# Patient Record
Sex: FEMALE | Race: OTHER | HISPANIC OR LATINO | URBAN - METROPOLITAN AREA
[De-identification: names, ages, dates, MRNs, and addresses within clinical notes are randomized per-mention and may not be internally consistent; named-entity substitution may affect disease eponyms.]

---

## 2017-09-05 ENCOUNTER — EMERGENCY (EMERGENCY)
Facility: HOSPITAL | Age: 72
LOS: 1 days | Discharge: HOME | End: 2017-09-05
Attending: EMERGENCY MEDICINE
Payer: MEDICARE

## 2017-09-05 VITALS
SYSTOLIC BLOOD PRESSURE: 115 MMHG | WEIGHT: 136.91 LBS | HEIGHT: 61 IN | DIASTOLIC BLOOD PRESSURE: 71 MMHG | RESPIRATION RATE: 16 BRPM | TEMPERATURE: 99 F | HEART RATE: 70 BPM | OXYGEN SATURATION: 98 %

## 2017-09-05 DIAGNOSIS — K57.80 DIVERTICULITIS OF INTESTINE, PART UNSPECIFIED, WITH PERFORATION AND ABSCESS WITHOUT BLEEDING: ICD-10-CM

## 2017-09-05 DIAGNOSIS — I10 ESSENTIAL (PRIMARY) HYPERTENSION: ICD-10-CM

## 2017-09-05 DIAGNOSIS — Z79.2 LONG TERM (CURRENT) USE OF ANTIBIOTICS: ICD-10-CM

## 2017-09-05 LAB
ALBUMIN SERPL ELPH-MCNC: 3.3 G/DL — LOW (ref 3.5–5)
ALP SERPL-CCNC: 87 U/L — SIGNIFICANT CHANGE UP (ref 40–120)
ALT FLD-CCNC: 20 U/L DA — SIGNIFICANT CHANGE UP (ref 10–60)
ANION GAP SERPL CALC-SCNC: 6 MMOL/L — SIGNIFICANT CHANGE UP (ref 5–17)
APPEARANCE UR: CLEAR — SIGNIFICANT CHANGE UP
AST SERPL-CCNC: 13 U/L — SIGNIFICANT CHANGE UP (ref 10–40)
BACTERIA # UR AUTO: ABNORMAL /HPF
BASOPHILS # BLD AUTO: 0.1 K/UL — SIGNIFICANT CHANGE UP (ref 0–0.2)
BASOPHILS NFR BLD AUTO: 0.7 % — SIGNIFICANT CHANGE UP (ref 0–2)
BILIRUB SERPL-MCNC: 0.6 MG/DL — SIGNIFICANT CHANGE UP (ref 0.2–1.2)
BILIRUB UR-MCNC: ABNORMAL
BUN SERPL-MCNC: 12 MG/DL — SIGNIFICANT CHANGE UP (ref 7–18)
CALCIUM SERPL-MCNC: 9.6 MG/DL — SIGNIFICANT CHANGE UP (ref 8.4–10.5)
CHLORIDE SERPL-SCNC: 109 MMOL/L — HIGH (ref 96–108)
CO2 SERPL-SCNC: 26 MMOL/L — SIGNIFICANT CHANGE UP (ref 22–31)
COLOR SPEC: YELLOW — SIGNIFICANT CHANGE UP
CREAT SERPL-MCNC: 0.88 MG/DL — SIGNIFICANT CHANGE UP (ref 0.5–1.3)
DIFF PNL FLD: ABNORMAL
EOSINOPHIL # BLD AUTO: 0.1 K/UL — SIGNIFICANT CHANGE UP (ref 0–0.5)
EOSINOPHIL NFR BLD AUTO: 0.5 % — SIGNIFICANT CHANGE UP (ref 0–6)
EPI CELLS # UR: ABNORMAL
GLUCOSE SERPL-MCNC: 96 MG/DL — SIGNIFICANT CHANGE UP (ref 70–99)
GLUCOSE UR QL: NEGATIVE — SIGNIFICANT CHANGE UP
HCT VFR BLD CALC: 37.6 % — SIGNIFICANT CHANGE UP (ref 34.5–45)
HGB BLD-MCNC: 12.4 G/DL — SIGNIFICANT CHANGE UP (ref 11.5–15.5)
KETONES UR-MCNC: ABNORMAL
LEUKOCYTE ESTERASE UR-ACNC: ABNORMAL
LIDOCAIN IGE QN: 130 U/L — SIGNIFICANT CHANGE UP (ref 73–393)
LYMPHOCYTES # BLD AUTO: 1.9 K/UL — SIGNIFICANT CHANGE UP (ref 1–3.3)
LYMPHOCYTES # BLD AUTO: 14 % — SIGNIFICANT CHANGE UP (ref 13–44)
MCHC RBC-ENTMCNC: 28.9 PG — SIGNIFICANT CHANGE UP (ref 27–34)
MCHC RBC-ENTMCNC: 33.1 GM/DL — SIGNIFICANT CHANGE UP (ref 32–36)
MCV RBC AUTO: 87.1 FL — SIGNIFICANT CHANGE UP (ref 80–100)
MONOCYTES # BLD AUTO: 0.9 K/UL — SIGNIFICANT CHANGE UP (ref 0–0.9)
MONOCYTES NFR BLD AUTO: 6.4 % — SIGNIFICANT CHANGE UP (ref 2–14)
NEUTROPHILS # BLD AUTO: 10.6 K/UL — HIGH (ref 1.8–7.4)
NEUTROPHILS NFR BLD AUTO: 78.3 % — HIGH (ref 43–77)
NITRITE UR-MCNC: NEGATIVE — SIGNIFICANT CHANGE UP
PH UR: 5 — SIGNIFICANT CHANGE UP (ref 5–8)
PLATELET # BLD AUTO: 391 K/UL — SIGNIFICANT CHANGE UP (ref 150–400)
POTASSIUM SERPL-MCNC: 3.4 MMOL/L — LOW (ref 3.5–5.3)
POTASSIUM SERPL-SCNC: 3.4 MMOL/L — LOW (ref 3.5–5.3)
PROT SERPL-MCNC: 7.2 G/DL — SIGNIFICANT CHANGE UP (ref 6–8.3)
PROT UR-MCNC: 30 MG/DL
RBC # BLD: 4.31 M/UL — SIGNIFICANT CHANGE UP (ref 3.8–5.2)
RBC # FLD: 12.4 % — SIGNIFICANT CHANGE UP (ref 10.3–14.5)
RBC CASTS # UR COMP ASSIST: SIGNIFICANT CHANGE UP /HPF (ref 0–2)
SODIUM SERPL-SCNC: 141 MMOL/L — SIGNIFICANT CHANGE UP (ref 135–145)
SP GR SPEC: 1.02 — SIGNIFICANT CHANGE UP (ref 1.01–1.02)
UROBILINOGEN FLD QL: 1
WBC # BLD: 13.6 K/UL — HIGH (ref 3.8–10.5)
WBC # FLD AUTO: 13.6 K/UL — HIGH (ref 3.8–10.5)
WBC UR QL: SIGNIFICANT CHANGE UP /HPF (ref 0–5)

## 2017-09-05 PROCEDURE — 74177 CT ABD & PELVIS W/CONTRAST: CPT | Mod: 26

## 2017-09-05 PROCEDURE — 74177 CT ABD & PELVIS W/CONTRAST: CPT

## 2017-09-05 PROCEDURE — 99284 EMERGENCY DEPT VISIT MOD MDM: CPT | Mod: 25

## 2017-09-05 PROCEDURE — 81001 URINALYSIS AUTO W/SCOPE: CPT

## 2017-09-05 PROCEDURE — 83690 ASSAY OF LIPASE: CPT

## 2017-09-05 PROCEDURE — 99285 EMERGENCY DEPT VISIT HI MDM: CPT

## 2017-09-05 PROCEDURE — 80053 COMPREHEN METABOLIC PANEL: CPT

## 2017-09-05 PROCEDURE — 85027 COMPLETE CBC AUTOMATED: CPT

## 2017-09-05 RX ORDER — METRONIDAZOLE 500 MG
1 TABLET ORAL
Qty: 30 | Refills: 0 | OUTPATIENT
Start: 2017-09-05 | End: 2017-09-15

## 2017-09-05 RX ORDER — CIPROFLOXACIN LACTATE 400MG/40ML
500 VIAL (ML) INTRAVENOUS EVERY 12 HOURS
Qty: 0 | Refills: 0 | Status: DISCONTINUED | OUTPATIENT
Start: 2017-09-05 | End: 2017-09-09

## 2017-09-05 RX ORDER — METRONIDAZOLE 500 MG
500 TABLET ORAL ONCE
Qty: 0 | Refills: 0 | Status: COMPLETED | OUTPATIENT
Start: 2017-09-05 | End: 2017-09-05

## 2017-09-05 RX ORDER — SODIUM CHLORIDE 9 MG/ML
1000 INJECTION INTRAMUSCULAR; INTRAVENOUS; SUBCUTANEOUS ONCE
Qty: 0 | Refills: 0 | Status: COMPLETED | OUTPATIENT
Start: 2017-09-05 | End: 2017-09-05

## 2017-09-05 RX ORDER — ACETAMINOPHEN 500 MG
650 TABLET ORAL ONCE
Qty: 0 | Refills: 0 | Status: COMPLETED | OUTPATIENT
Start: 2017-09-05 | End: 2017-09-05

## 2017-09-05 RX ORDER — SODIUM CHLORIDE 9 MG/ML
1000 INJECTION INTRAMUSCULAR; INTRAVENOUS; SUBCUTANEOUS ONCE
Qty: 0 | Refills: 0 | Status: DISCONTINUED | OUTPATIENT
Start: 2017-09-05 | End: 2017-09-05

## 2017-09-05 RX ORDER — MOXIFLOXACIN HYDROCHLORIDE TABLETS, 400 MG 400 MG/1
1 TABLET, FILM COATED ORAL
Qty: 20 | Refills: 0 | OUTPATIENT
Start: 2017-09-05 | End: 2017-09-15

## 2017-09-05 RX ORDER — SODIUM CHLORIDE 9 MG/ML
1000 INJECTION INTRAMUSCULAR; INTRAVENOUS; SUBCUTANEOUS
Qty: 0 | Refills: 0 | Status: DISCONTINUED | OUTPATIENT
Start: 2017-09-05 | End: 2017-09-09

## 2017-09-05 RX ADMIN — Medication 500 MILLIGRAM(S): at 15:43

## 2017-09-05 RX ADMIN — SODIUM CHLORIDE 200 MILLILITER(S): 9 INJECTION INTRAMUSCULAR; INTRAVENOUS; SUBCUTANEOUS at 11:51

## 2017-09-05 RX ADMIN — SODIUM CHLORIDE 4000 MILLILITER(S): 9 INJECTION INTRAMUSCULAR; INTRAVENOUS; SUBCUTANEOUS at 11:08

## 2017-09-05 RX ADMIN — Medication 650 MILLIGRAM(S): at 11:10

## 2017-09-05 RX ADMIN — Medication 650 MILLIGRAM(S): at 11:20

## 2017-09-05 NOTE — ED PROVIDER NOTE - PHYSICAL EXAMINATION
Physical Exam: elderly F who is in NAD, AAOx3, NCAT, MMM, neck is supple, PERRL, CTAB, normal rate and regular rhythm, abdomen is soft and ND, + TTP to the suprapubic and LLQ, no rebound, no CVA tenderness, No edema, No deformity of extremities, No rashes, CN grossly intact, No focal motor or sensory deficits. ~ Cole Marin MD elderly F who is in NAD, AAOx3, NCAT, MMM, neck is supple, PERRL, CTAB, normal rate and regular rhythm, abdomen is soft and ND, + TTP to the suprapubic and LLQ, no rebound, no CVA tenderness, No edema, No deformity of extremities, No rashes, CN grossly intact, No focal motor or sensory deficits.

## 2017-09-05 NOTE — ED PROVIDER NOTE - NS ED ROS FT
No fever, no chills, no change in vision, no change in hearing, no chest pain, no shortness of breath, + abdominal pain, + diarrhea, no vomiting, no dysuria, no muscle pain, no rashes, no loss of consciousness. ~ Cole Marin MD

## 2017-09-05 NOTE — ED PROVIDER NOTE - ATTENDING CONTRIBUTION TO CARE
Dr. DEE Orlando:  I have independently evaluated the patient and have documented in the appropriate sections above.  I agree with the exam and plan as noted above. Dr. DEE Orlando:  I have independently evaluated the patient and have documented in the appropriate sections above.  I agree with the exam and plan as noted above.  6 days associated with watery brown nonbloody diarrhea  + TTP to the suprapubic and left lower quadrant  CT to eval for colitis,   found to be positive

## 2017-09-05 NOTE — ED PROVIDER NOTE - PROGRESS NOTE DETAILS
The abnomal lab/radiology findings were expressed to the patient. A copy of all the results from today's visit was provided to the patient to assist in the continued workup by the patient's outpatient provider. The patient is advised to follow up with the outpatient provider with these results in hand at the time advised on the discharge document.

## 2017-09-05 NOTE — ED PROVIDER NOTE - OBJECTIVE STATEMENT
Cole Marin MD (resident): Lower abdominal cramping pain for the last 6 days associated with watery brown nonbloody diarrhea about 3-4 times per day. Started after pt had fish. No other people had this same meal. No sick contacts. No recent travel, camping. No recent hospitalizations or antibiotics. Denies F/C, vomiting. Lower abdominal cramping pain for the last 6 days associated with watery brown nonbloody diarrhea about 3-4 times per day. Started after pt had fish. No other people had this same meal. No sick contacts. No recent travel, camping. No recent hospitalizations or antibiotics. Denies F/C, vomiting.

## 2017-09-05 NOTE — ED PROVIDER NOTE - PLAN OF CARE
1) Please follow-up with your Primary Medical Doctor in 3-5 days. If you need to find a new physician, please call [phone].  2) Return to the Emergency Department if you experiences: abdominal pain, fevers, chills, or symptoms that are new or recurrent.  3) If you have any questions or concerns, do not hesitate to contact us at [phone].  4) Take the antibiotics as prescribed.

## 2017-09-05 NOTE — ED PROVIDER NOTE - MEDICAL DECISION MAKING DETAILS
Cole Marin MD (resident): 71 F w/ Hx of HTN who p/w diarrhea and lower abd pain and cramping. No risk factors for giardia or CDiff. As pt with mild TTP to lower abd on the suprapubic and LLQ, will get CT to eval for colitis, diverticulitis. IVF and labs. 71 F w/ Hx of HTN who p/w diarrhea and lower abd pain and cramping. No risk factors for giardia or CDiff. As pt with mild TTP to lower abd on the suprapubic and LLQ, will get CT to eval for colitis, diverticulitis. IVF and labs.

## 2017-09-05 NOTE — ED ADULT NURSE NOTE - OBJECTIVE STATEMENT
presented with c/o lower abd pain and diarrhea denies N/V dysuria seen and evaluated by MD Orlando pending CT abd IVF in progress

## 2017-09-05 NOTE — ED PROVIDER NOTE - CARE PLAN
Principal Discharge DX:	Diverticulitis of intestine with abscess, unspecified bleeding status, unspecified part of intestinal tract Principal Discharge DX:	Diverticulitis of intestine with abscess, unspecified bleeding status, unspecified part of intestinal tract  Instructions for follow-up, activity and diet:	1) Please follow-up with your Primary Medical Doctor in 3-5 days. If you need to find a new physician, please call [phone].  2) Return to the Emergency Department if you experiences: abdominal pain, fevers, chills, or symptoms that are new or recurrent.  3) If you have any questions or concerns, do not hesitate to contact us at [phone].  4) Take the antibiotics as prescribed. Principal Discharge DX:	Diverticulitis of intestine with abscess, unspecified bleeding status, unspecified part of intestinal tract  Instructions for follow-up, activity and diet:	1) Please follow-up with your Primary Medical Doctor in 3-5 days. If you need to find a new physician, please call [phone].  2) Return to the Emergency Department if you experiences: abdominal pain, fevers, chills, or symptoms that are new or recurrent.  3) If you have any questions or concerns, do not hesitate to contact us at [phone].  4) Take the antibiotics as prescribed.

## 2020-09-28 ENCOUNTER — EMERGENCY (EMERGENCY)
Facility: HOSPITAL | Age: 75
LOS: 1 days | Discharge: HOME | End: 2020-09-28
Attending: STUDENT IN AN ORGANIZED HEALTH CARE EDUCATION/TRAINING PROGRAM
Payer: MEDICARE

## 2020-09-28 VITALS
RESPIRATION RATE: 20 BRPM | HEIGHT: 61 IN | HEART RATE: 90 BPM | SYSTOLIC BLOOD PRESSURE: 130 MMHG | WEIGHT: 128.97 LBS | DIASTOLIC BLOOD PRESSURE: 66 MMHG | TEMPERATURE: 98 F | OXYGEN SATURATION: 95 %

## 2020-09-28 LAB
ALBUMIN SERPL ELPH-MCNC: 3.5 G/DL — SIGNIFICANT CHANGE UP (ref 3.5–5)
ALP SERPL-CCNC: 97 U/L — SIGNIFICANT CHANGE UP (ref 40–120)
ALT FLD-CCNC: 19 U/L DA — SIGNIFICANT CHANGE UP (ref 10–60)
ANION GAP SERPL CALC-SCNC: 6 MMOL/L — SIGNIFICANT CHANGE UP (ref 5–17)
AST SERPL-CCNC: 11 U/L — SIGNIFICANT CHANGE UP (ref 10–40)
BASOPHILS # BLD AUTO: 0.1 K/UL — SIGNIFICANT CHANGE UP (ref 0–0.2)
BASOPHILS NFR BLD AUTO: 0.6 % — SIGNIFICANT CHANGE UP (ref 0–2)
BILIRUB SERPL-MCNC: 0.7 MG/DL — SIGNIFICANT CHANGE UP (ref 0.2–1.2)
BUN SERPL-MCNC: 19 MG/DL — HIGH (ref 7–18)
CALCIUM SERPL-MCNC: 10.6 MG/DL — HIGH (ref 8.4–10.5)
CHLORIDE SERPL-SCNC: 107 MMOL/L — SIGNIFICANT CHANGE UP (ref 96–108)
CO2 SERPL-SCNC: 26 MMOL/L — SIGNIFICANT CHANGE UP (ref 22–31)
CREAT SERPL-MCNC: 1.17 MG/DL — SIGNIFICANT CHANGE UP (ref 0.5–1.3)
EOSINOPHIL # BLD AUTO: 0.08 K/UL — SIGNIFICANT CHANGE UP (ref 0–0.5)
EOSINOPHIL NFR BLD AUTO: 0.5 % — SIGNIFICANT CHANGE UP (ref 0–6)
ERYTHROCYTE [SEDIMENTATION RATE] IN BLOOD: 45 MM/HR — HIGH (ref 0–20)
GLUCOSE SERPL-MCNC: 108 MG/DL — HIGH (ref 70–99)
HCT VFR BLD CALC: 37.8 % — SIGNIFICANT CHANGE UP (ref 34.5–45)
HGB BLD-MCNC: 12.9 G/DL — SIGNIFICANT CHANGE UP (ref 11.5–15.5)
IMM GRANULOCYTES NFR BLD AUTO: 0.6 % — SIGNIFICANT CHANGE UP (ref 0–1.5)
LACTATE SERPL-SCNC: 0.8 MMOL/L — SIGNIFICANT CHANGE UP (ref 0.7–2)
LYMPHOCYTES # BLD AUTO: 12.8 % — LOW (ref 13–44)
LYMPHOCYTES # BLD AUTO: 2 K/UL — SIGNIFICANT CHANGE UP (ref 1–3.3)
MAGNESIUM SERPL-MCNC: 2 MG/DL — SIGNIFICANT CHANGE UP (ref 1.6–2.6)
MCHC RBC-ENTMCNC: 30.1 PG — SIGNIFICANT CHANGE UP (ref 27–34)
MCHC RBC-ENTMCNC: 34.1 GM/DL — SIGNIFICANT CHANGE UP (ref 32–36)
MCV RBC AUTO: 88.1 FL — SIGNIFICANT CHANGE UP (ref 80–100)
MONOCYTES # BLD AUTO: 0.85 K/UL — SIGNIFICANT CHANGE UP (ref 0–0.9)
MONOCYTES NFR BLD AUTO: 5.4 % — SIGNIFICANT CHANGE UP (ref 2–14)
NEUTROPHILS # BLD AUTO: 12.49 K/UL — HIGH (ref 1.8–7.4)
NEUTROPHILS NFR BLD AUTO: 80.1 % — HIGH (ref 43–77)
NRBC # BLD: 0 /100 WBCS — SIGNIFICANT CHANGE UP (ref 0–0)
PLATELET # BLD AUTO: 357 K/UL — SIGNIFICANT CHANGE UP (ref 150–400)
POTASSIUM SERPL-MCNC: 3.7 MMOL/L — SIGNIFICANT CHANGE UP (ref 3.5–5.3)
POTASSIUM SERPL-SCNC: 3.7 MMOL/L — SIGNIFICANT CHANGE UP (ref 3.5–5.3)
PROT SERPL-MCNC: 7.6 G/DL — SIGNIFICANT CHANGE UP (ref 6–8.3)
RBC # BLD: 4.29 M/UL — SIGNIFICANT CHANGE UP (ref 3.8–5.2)
RBC # FLD: 13.6 % — SIGNIFICANT CHANGE UP (ref 10.3–14.5)
SODIUM SERPL-SCNC: 139 MMOL/L — SIGNIFICANT CHANGE UP (ref 135–145)
WBC # BLD: 15.61 K/UL — HIGH (ref 3.8–10.5)
WBC # FLD AUTO: 15.61 K/UL — HIGH (ref 3.8–10.5)

## 2020-09-28 PROCEDURE — 99283 EMERGENCY DEPT VISIT LOW MDM: CPT | Mod: 25

## 2020-09-28 PROCEDURE — 73080 X-RAY EXAM OF ELBOW: CPT | Mod: 26,LT

## 2020-09-28 PROCEDURE — 73080 X-RAY EXAM OF ELBOW: CPT

## 2020-09-28 PROCEDURE — 99283 EMERGENCY DEPT VISIT LOW MDM: CPT

## 2020-09-28 PROCEDURE — 86140 C-REACTIVE PROTEIN: CPT

## 2020-09-28 PROCEDURE — 85652 RBC SED RATE AUTOMATED: CPT

## 2020-09-28 PROCEDURE — 83605 ASSAY OF LACTIC ACID: CPT

## 2020-09-28 PROCEDURE — 87040 BLOOD CULTURE FOR BACTERIA: CPT

## 2020-09-28 PROCEDURE — 80053 COMPREHEN METABOLIC PANEL: CPT

## 2020-09-28 PROCEDURE — 85025 COMPLETE CBC W/AUTO DIFF WBC: CPT

## 2020-09-28 PROCEDURE — 36415 COLL VENOUS BLD VENIPUNCTURE: CPT

## 2020-09-28 PROCEDURE — 83735 ASSAY OF MAGNESIUM: CPT

## 2020-09-28 RX ORDER — CEFPODOXIME PROXETIL 100 MG
200 TABLET ORAL ONCE
Refills: 0 | Status: COMPLETED | OUTPATIENT
Start: 2020-09-28 | End: 2020-09-28

## 2020-09-28 RX ORDER — CEFPODOXIME PROXETIL 100 MG
1 TABLET ORAL
Qty: 20 | Refills: 0
Start: 2020-09-28 | End: 2020-10-07

## 2020-09-28 RX ORDER — ACETAMINOPHEN 500 MG
975 TABLET ORAL ONCE
Refills: 0 | Status: COMPLETED | OUTPATIENT
Start: 2020-09-28 | End: 2020-09-28

## 2020-09-28 RX ADMIN — Medication 975 MILLIGRAM(S): at 16:12

## 2020-09-28 RX ADMIN — Medication 200 MILLIGRAM(S): at 18:38

## 2020-09-28 NOTE — ED PROVIDER NOTE - PHYSICAL EXAMINATION
General: well appearing female, no acute distress   HEENT: normocephalic, atraumatic   Respiratory: normal work of breathing, lungs clear to auscultation bilaterally   Cardiac: regular rate and rhythm   Abdomen: soft, non-tender, no guarding or rebound   MSK: LUE swelling, tenderness to palpation, full passive ROM  Skin: warm, dry   Neuro: A&Ox3  Psych: appropriate affect

## 2020-09-28 NOTE — ED PROVIDER NOTE - PATIENT PORTAL LINK FT
You can access the FollowMyHealth Patient Portal offered by [facility] by registering at the following website: [email or web address]. By joining [facility]’s FollowMyHealth portal, you will also be able to view your health information using other applications (apps) compatible with our system.

## 2020-09-28 NOTE — ED PROVIDER NOTE - PROGRESS NOTE DETAILS
patient and family updated on results. will give antibiotics and discharge with ortho follow-up. Dedrick Arthur

## 2020-09-28 NOTE — ED PROVIDER NOTE - CLINICAL SUMMARY MEDICAL DECISION MAKING FREE TEXT BOX
74F presenting with left elbow pain and swelling. erythema, warmth, tender to palpation but with full ROM. concern for cellulitis, bursitis, septic joint. labs, xray, will reassess.

## 2020-09-28 NOTE — ED PROVIDER NOTE - NSFOLLOWUPINSTRUCTIONS_ED_ALL_ED_FT
Lo atendieron en el departamento de emergencias por dolor en el codo probablemente causado por dylan bursitis o celulitis infectada.    Ledy un seguimiento con un cirujano ortopédico gabriel la próxima semana.    Complete pan ciclo completo de antibióticos incluso si heide síntomas mejoran.    Si tiene algún síntoma que empeora, dolor severo en el codo, aumento del enrojecimiento o hinchazón, regrese al departamento de emergencias.    Translation via Google Translate. Cannot guarantee accuracy.     You were seen in the emergency department for elbow pain likely caused by infected bursitis or cellulitis.     Please follow-up with an orthopedic surgeon within the next week.     Please complete your full course of antibiotics even if your symptoms improve.     If you have any worsening symptoms, severe elbow pain, increased redness or swelling, please return to the emergency department. Lo atendieron en el departamento de emergencias por dolor en el codo probablemente causado por dylan bursitis o celulitis infectada.    Maximo un seguimiento con un cirujano ortopédico gabriel la próxima semana.    Complete pan ciclo completo de antibióticos incluso si daniela síntomas mejoran.    Si tiene algún síntoma que empeora, dolor severo en el codo, aumento del enrojecimiento o hinchazón, regrese al departamento de emergencias.    Translation via Google Translate. Cannot guarantee accuracy.           Bursitis de codo    LO QUE NECESITA SABER:    ¿Qué es la bursitis de codo?La bursitis de codo es la inflamación de la bursa del codo. La bursa es dylan bolsa (saco) llena de líquido que actúa mimi colchón entre un hueso y un tendón. Los tendones son cordones de tejido resistente que conectan los músculos a los huesos. La bursa está situada fernanda por debajo de la punta del codo.    ¿Cuáles son las causas de la bursitis de codo?  •Dylan lesión, mimi dylan caída      •El uso excesivo del codo, mimi cuando usted juega junaidis, usa dylan aspiradora o un martillo      •La presión en los codos, mimi cuando usted se apoya sobre daniela codos      •Dylan infección bacterial      •Afecciones médicas mimi la artritis reumatoide o gota      ¿Cuáles son los signos y síntomas de la bursitis de codo?  •Dolor o sensibilidad al  el codo      •Enrojecimiento o inflamación en o alrededor de la punta del codo      •Disminución del movimiento del codo      •La piel que cubre el codo se siente caliente      ¿Cómo se diagnostica la bursitis de codo?Pan médico le examinará el codo y le preguntará sobre pan lesión o actividades. Es posible que usted necesite alguno de los siguientes:   •Análisis de dwain:Se examina la dwain en busca de signos de infección. Los médicos también pueden detectar por enfermedades que le estén causando pan bursitis, mimi la artritis reumatoide.      •Radiografía:Estas imágenes muestran problemas con la posición de los huesos, artritis o fracturas.      •Imágenes por resonancia magnética (IRM):En esta prueba se usan imanes potentes y dylan computadora para ashlee imágenes de pan codo. La IRM podría mostrar daño en el tejido o artritis. Le podrían administrar un tinte para ayudar a que las imágenes se vean mejor. Dígale al médico si usted alguna vez ha tenido dylan reacción alérgica al tinte de contraste. No entre a la vera donde se realiza la resonancia magnética con algo de metal. El metal puede causar lesiones serias. Dígale al médico si usted tiene algo de metal dentro de pan cuerpo o por encima.      •Prueba de líquidos:Los médicos utilizan dylan aguja para extraer líquido de pan bursa. El líquido luego se envía a un laboratorio donde le realizan pruebas para thania si hay infección. La extracción del líquido de la bursa también podría ayudar a aliviar daniela síntomas.      ¿Cómo se trata la bursitis de codo?  •Medicamento:?AINEs (analgésicos antiinflamatorios no esteroides):Estos medicamentos disminuyen la inflamación, dolor y fiebre. Los AINEs se pueden obtener sin receta médica. Consulte con pan médico cuál medicamento es el adecuado para usted. Pregunte cuánto ashlee y cuándo. Tómelos mimi se le indique. Cuando no se serena de la manera indicada, los medicamentos antiinflamatorios no esteroides pueden causar sangrado estomacal y problemas renales.      ?Antibióticos:Estos medicamentos ayudan a combatir las infecciones causadas por bacterias. Es posible que deba ashlee antibióticos si la causa de pan bursitis es dylan infección.      ?Inyección de esteroides:Esta inyección ayudara a disminuir el dolor y la hinchazón.      •Cirugía:Usted puede necesitar cirugía para extraer la bursa o parte del hueso del codo. La cirugía solamente se realiza en casos en que otros tratamientos no hayan funcionado.      ¿Cuáles son los riesgos de la bursitis de codo?La infección podría propagarse a las articulaciones cercanas. Usted podría desarrollar bursitis crónica. [place] podría hacer que sienta dolor y que daniela movimientos se vean seriamente limitados.    ¿Cómo puedo controlar los síntomas?  •Descanse:Descanse el codo todo el tiempo que le sea posible para disminuir el dolor y la inflamación. Empiece a hacer un poco más día a día. Regrese a daniela actividades diarias mimi se le haya indicado.      •Hielo:El hielo ayuda a disminuir la inflamación y el dolor. El hielo también puede contribuir a evitar el daño de los tejidos. Use dylan compresa de hielo o ponga hielo triturado en dylan bolsa de plástico. Cúbralo con dylan toalla y colóquelo sobre el codo por 15 a 20 minutos, de 3 a 4 veces al día, mimi se le indique.      •Compresión:Es probable que los médicos le envuelvan el brazo con cinta o vendas elásticas para disminuir la inflamación. Afloje la venda elástica si empieza a perder sensación en los dedos.      •Eleve:Eleve el codo de manera que quede por encima del nivel del corazón. [place] va a disminuir inflamación y el dolor. Apoye pan codo sobre almohadas o mantas para mantenerlo elevado cómodamente.      •Fisioterapia:Un fisioterapeuta le puede enseñar ejercicios para ayudarle a mejorar el movimiento y la fuerza, y para disminuir el dolor.      ¿Cómo puedo prevenir la bursitis de codo?  •Evite lesiones y presión en los codos:Use coderas o protectores cuando maximo deportes. No se apoye sobre daniela codos o cierre los puños fuertemente. No apriete objetos pequeños con fuerza, mimi por ejemplo herramientas o plumas.      •Maximo estiramientos, calentamiento y enfriamiento:Siempre estire los músculos y maximo ejercicios de calentamiento y de enfriamiento antes y después de ejercitarse. [place] aflojará daniela músculos y disminuirá el estrés en daniela codos. Descanse entre sesiones de ejercicio.      ¿Cuándo toby comunicarme con mi médico?  •Pan dolor e inflamación aumentan      •Daniela síntomas no mejoran después de 10 días de tratamiento.      •Tiene fiebre.      •Usted tiene preguntas o inquietudes acerca de pan condición o cuidado.      ACUERDOS SOBRE PAN CUIDADO:    Usted tiene el derecho de ayudar a planear pan cuidado. Aprenda todo lo que pueda sobre pan condición y mimi darle tratamiento. Discuta daniela opciones de tratamiento con daniela médicos para decidir el cuidado que usted desea recibir. Usted siempre tiene el derecho de rechazar el tratamiento.      You were seen in the emergency department for elbow pain likely caused by infected bursitis or cellulitis.     Please follow-up with an orthopedic surgeon within the next week.     Please complete your full course of antibiotics even if your symptoms improve.     If you have any worsening symptoms, severe elbow pain, increased redness or swelling, please return to the emergency department.

## 2020-09-28 NOTE — ED PROVIDER NOTE - OBJECTIVE STATEMENT
74F, pmh of htn, presenting with two days of left elbow pain and redness. patient denies any trauma or bug bites. increasing swelling and redness of left elbow. has pain with movement of elbow. no fever, chest pain, shortness of breath, nausea, vomiting, abdominal pain, pain or burning with urination.

## 2020-09-28 NOTE — ED ADULT TRIAGE NOTE - CHIEF COMPLAINT QUOTE
Redness and swelling to elbow with redness, tenderness and swelling to upper  and forearm.  Denies trauma

## 2020-09-29 LAB — CRP SERPL-MCNC: 7.7 MG/DL — HIGH (ref 0–0.4)

## 2020-09-29 NOTE — ED POST DISCHARGE NOTE - DETAILS
aware of results. still has pain. states she will  RX today and start abx. she will FU with ortho. states Iv was left in. instructed to go back to ER to get it removed asap

## 2020-10-03 LAB
CULTURE RESULTS: SIGNIFICANT CHANGE UP
CULTURE RESULTS: SIGNIFICANT CHANGE UP
SPECIMEN SOURCE: SIGNIFICANT CHANGE UP
SPECIMEN SOURCE: SIGNIFICANT CHANGE UP

## 2021-06-18 ENCOUNTER — RESULT REVIEW (OUTPATIENT)
Age: 76
End: 2021-06-18